# Patient Record
Sex: FEMALE | Race: WHITE
[De-identification: names, ages, dates, MRNs, and addresses within clinical notes are randomized per-mention and may not be internally consistent; named-entity substitution may affect disease eponyms.]

---

## 2018-05-04 ENCOUNTER — HOSPITAL ENCOUNTER (OUTPATIENT)
Dept: HOSPITAL 68 - ERH | Age: 63
Setting detail: OBSERVATION
LOS: 1 days | End: 2018-05-05
Attending: INTERNAL MEDICINE | Admitting: INTERNAL MEDICINE
Payer: COMMERCIAL

## 2018-05-04 VITALS — WEIGHT: 154.06 LBS | BODY MASS INDEX: 25.98 KG/M2 | HEIGHT: 64.5 IN

## 2018-05-04 DIAGNOSIS — Z79.82: ICD-10-CM

## 2018-05-04 DIAGNOSIS — R07.89: Primary | ICD-10-CM

## 2018-05-04 DIAGNOSIS — R00.0: ICD-10-CM

## 2018-05-04 DIAGNOSIS — F41.9: ICD-10-CM

## 2018-05-04 DIAGNOSIS — M81.0: ICD-10-CM

## 2018-05-04 DIAGNOSIS — F41.0: ICD-10-CM

## 2018-05-04 DIAGNOSIS — I10: ICD-10-CM

## 2018-05-04 LAB
ABSOLUTE GRANULOCYTE CT: 4.5 /CUMM (ref 1.4–6.5)
BASOPHILS # BLD: 0 /CUMM (ref 0–0.2)
BASOPHILS NFR BLD: 0.4 % (ref 0–2)
EOSINOPHIL # BLD: 0.1 /CUMM (ref 0–0.7)
EOSINOPHIL NFR BLD: 1.8 % (ref 0–5)
ERYTHROCYTE [DISTWIDTH] IN BLOOD BY AUTOMATED COUNT: 13.5 % (ref 11.5–14.5)
GRANULOCYTES NFR BLD: 55.3 % (ref 42.2–75.2)
HCT VFR BLD CALC: 40.6 % (ref 37–47)
LYMPHOCYTES # BLD: 2.6 /CUMM (ref 1.2–3.4)
MCH RBC QN AUTO: 28.4 PG (ref 27–31)
MCHC RBC AUTO-ENTMCNC: 33.6 G/DL (ref 33–37)
MCV RBC AUTO: 84.5 FL (ref 81–99)
MONOCYTES # BLD: 0.8 /CUMM (ref 0.1–0.6)
PLATELET # BLD: 335 /CUMM (ref 130–400)
PMV BLD AUTO: 8.4 FL (ref 7.4–10.4)
RED BLOOD CELL CT: 4.8 /CUMM (ref 4.2–5.4)
WBC # BLD AUTO: 8.2 /CUMM (ref 4.8–10.8)

## 2018-05-04 PROCEDURE — G0378 HOSPITAL OBSERVATION PER HR: HCPCS

## 2018-05-04 NOTE — HISTORY & PHYSICAL
Dee Yo 05/04/18 2330:
General Information and HPI
History of Present Illness:
Ms Weathers is a 62-year-old female with a PMH of anxiety who presents to the 
ED with substernal chest pressure.  Today while driving to a friend's house she 
had a panic attack followed by substernal chest pressure relieved with deep 
breathing.  Her chest pressure was accompanied by palpitations and flushing.  
She also felt disoriented and had to stop driving to reorient herself.  Patient 
reports for the past couple of months she has been having intermittent chest 
pressure at rest.  She has reported her chest pain to her PCP and she recently 
underwent a stress test done that was negative.  She reports on one occasion it 
radiated to her arm but unsure of which side.  She gets panic attacks sometimes 
and may take her lorazepam more frequently than prescribed but this panic attack
seems worse.  Her last episode was a month ago while in a store and she felt 
that she had to leave immediately.  She reports extended grief due to the loss 
of her son from cancer 5 years ago and under a lot of stress due to marital 
problems.  She denies fever, chills, exertional SOB/CP, nausea, vomiting, 
orthopnea, lower extremity edema, urinary or bowel symptoms.
 
In the ED she was tachycardic and was given a full dose ASA and lorazepam that 
has partially relieved her chest pressure.
 
Allergies/Medications
Allergies:
Coded Allergies:
No Known Allergies (05/04/18)
 
Home Med list
Lorazepam 0.5 MG TABLET   1 TAB PO DAILY AS NEEDED SLEEP  (Reported)
 
 
Past History
 
Travel History
Traveled to [place] past 21 day No
 
Medical History
Psychiatric: anxiety
 
Surgical History
Surgical History: non-contributory
 
Review of Systems
 
Review of Systems
Constitutional:
Reports: see HPI. 
 
Exam & Diagnostic Data
Last 24 Hrs of Vital Signs/I&O
 Vital Signs
 
 
Date Time Temp Pulse Resp B/P B/P Pulse O2 O2 Flow FiO2
 
     Mean Ox Delivery Rate 
 
05/04 2249  73 20 147/74  94 Room Air  
 
05/04 2031  86 18 136/72  95 Room Air  
 
05/04 1859       Room Air  
 
05/04 1821 97.8 112 15 153/82  96 Room Air Room Air 
 
 
 
 
Physical Exam
General Appearance Alert, Oriented X3, Cooperative, No Acute Distress
HEENT Atraumatic, PERRLA, EOMI, Mucous Membr. moist/pink
Neck Supple, No JVD, No thryomegaly
Cardiovascular Regular Rate, Normal S1, Normal S2, No Murmurs
Lungs Clear to Auscultation, Normal Air Movement
Abdomen Normal Bowel Sounds, Soft, No Tenderness
Extremities No Edema, Normal Pulses
Last 24 Hrs of Labs/Carlito:
 Laboratory Tests
 
05/04/18 2147:
Troponin I 0.01
 
05/04/18 1839:
Anion Gap 11, Estimated GFR > 60, BUN/Creatinine Ratio 21.3, Glucose 106  H, 
Calcium 9.6, Total Bilirubin 0.5, AST 22, ALT 26, Alkaline Phosphatase 80, 
Troponin I < 0.01, Total Protein 7.0, Albumin 4.4, Globulin 2.6, Albumin/
Globulin Ratio 1.7, Lipase 146, TSH 1.570, CBC w Diff NO MAN DIFF REQ, RBC 4.80,
MCV 84.5, MCH 28.4, MCHC 33.6, RDW 13.5, MPV 8.4, Gran % 55.3, Lymphocytes % 
32.2, Monocytes % 10.3  H, Eosinophils % 1.8, Basophils % 0.4, Absolute 
Granulocytes 4.5, Absolute Lymphocytes 2.6, Absolute Monocytes 0.8  H, Absolute 
Eosinophils 0.1, Absolute Basophils 0
 
 
Diagnostic Data
EKG Results
SR, ST depression in V4, V5, , 
 
Assessment/Plan
Assessment:
Ms Weathers is a 62-year-old female with a PMH of anxiety who presents to the 
ED with substernal chest pressure with ST depressions in the setting of sinus 
tachycardia and negative troponins.  Repeat ECG showed PVCs and nonspecific T-
wave inversion, HR 78.  She also had a recent stress test that was negative.
 
Problem list:
Atypical chest pain most likely secondary to anxiety
History of anxiety
 
Plan:
* Admit to telemetry OBS for further evaluation and monitoring
* Serial troponin/ECG to rule out ACS
* Lipid panel
* D-dimer - low probability of PE
* Resume home meds: Lorazepam 0.5 mg daily
* Consider outpatient psychiatric evaluation for extended grief
 
DVT ppx: sc Lovenox
Diet: Regular
Code: FULL
 
As Ranked By This Provider
Problem List:
 1. Acute electrocardiogram changes
 
 2. Chest pain at rest
 
 
Core Measures/Misc (9/17)
 
Acute Coronary Syndrome
ACS Diagnosis: No
 
Congestive Heart Failure
Congestive Heart Failure Diagnosis No
 
Cerebrovascular Accident
CVA/TIA Diagnosis: No
 
VTE (View Protocol)
VTE Risk Factors Age>40
No Mechanical VTE Prophylaxis d/t N/A MechProphylax Ordered
No VTE Pharm Prophylaxis d/t NA PharmProphylax ordered
 
Sepsis (View protocol)
Sepsis Present: No
 
Breanna Olson MD 05/05/18 0219:
General Information and HPI
MD Statement:
I have seen and personally examined REI WEATHERS and documented this H&P.
 
The patient is a 62 year old F who presented with a patient stated chief 
complaint of [].
 
 
 
Resident Review Statement
Resident Statement: examined this patient, discussed with intern, agreed with 
intern
Other Findings:
This is a 62-year-old female with past medical history significant for anxiety 
who comes in today for chief complaint of chest pressure.  Patient stated that 
while driving to her friend's house she had sudden onset of chest pressure like 
sensation associated with some disorientation.  She endorses some palpitations 
and shortness of breath but said that the sensation was relieved by sighing and 
deep breathing.  This episode was also accompanied by a sensation of warmth/
flushing.  She describes the pain as nonradiating and nonexertional.  Patient 
states that she has had similar episodes but not as intense. She does endorse 
history of anxiety and panic attack.  Last panic attack was around a month ago 
and it was while she was in the supermarket and she had an overwhelming feeling 
that she had to get out of the building.  She has recent stressors including 5-
year-old anniversary of losing her son and some marital troubles.  She states 
she is a never smoker and an occasional drinker, she drank a glass of wine today
with her lunch, denies any drug abuse.  Surgical history pertinent for hernia, 
and fibroid surgery.  Family history pertinent for MI in father in his 70s.  
Patient states that her PCP Oanh Conde DO referred her for a stress test 
sometime earlier this year to evaluate her episodes of chest pressure.  Per 
patient, her stress test was negative.
 
 
Vitals: 97.8, 112, 15, 153/82, 94.
HEENT: Pupils equal and reactive. EOMI
Cardiovascular: Nml s1/s2; no murmurs
Skin:  no erythema, rash or wounds present. 
Respiratory:CTAB
GI: BSX4, No tenderness on palpation. 
EXT:  No skin changes in bilat LE.  Trace edema.
 
 
In ED patient was noted to have EKGs showing sinus tachycardia with slight ST 
depression in V4 and V5 and T-wave inversion in V1.  Repeat EKG showed rate 78, 
2 PVCs and no ST changes.  She received loading dose of aspirin and a dose of 
lorazepam.
 
Assessment: This is a 62-year-old female with past medical history of anxiety 
who comes in for chief complaint of chest pain.  Her medical history, lack of 
social risk factors, and atypical description of chest pain suggests anoncardiac
etiology.  Given, significant history of anxiety, and panic attack along with 
nonexertional chest pain suggests most likely etiology.  However, given EKG 
changes she is admitted to the telemetry floor for further workup.
 
PLAN:
 
Chest pain:
* Aspirin
* Lipid panel.  If elevated consider statin
* Obtain records from PCP
* EKG and troponins
* Cardiology consult in a.m.
 
Anxiety:
* Continue lorazepam
* Consider outpatient psychiatric evaluation
 
 
Vikas,Aartee 05/05/18 0304:
Attending MD Review Statement
 
Attending Statement
Attending MD Statement: examined this patient, discuss w/resident/PA/NP, agreed 
w/resident/PA/NP, reviewed EMR data (avail), reviewed images, amended to note
Attending Assessment/Plan:
 
CC: chest pressure 
PMH: ? Panic attack, anxiety
 
Patient states that she was driving to her friend's house when she noticed that 
she had chest pressure, felt disoriented and dizzy. Chest pressure was 
substernal not radiating, relieved by deep breathing, associated with mild 
shortness of breath and palpitation and feeling hot. Patient felt better after 
Ativan in ER. She denies any pleuritic nature of chest pain, cough, 
expectoration, actual passing out, diaphoresis. Patient has been having similar 
episode since last 2 months, on and off, nonexertional chest pain and 
palpitations, for which she was evaluated with stress test by her primary care 
physician which was normal. She denies any smoking history, father had MI at the
age of 70, no history of dyslipidemia. She denies any nausea, vomiting, 
orthopnea, leg edema, PND, exertional chest pain or dyspnea. She has been going 
through stress secondary to household problems and her son's death 5 years back.
She is on Ativan at home as required and sometimes has to take more than 1 
tablet in a day. 
 
Vitals: Temperature 97.8, pulse 1121 arrival improved to 86, respiration 15, 
blood pressure 153/82, saturating 96% on room air.
On examination: A O 3, cooperative, no acute distress, neck supple, JVD normal,
no lymphadenopathy, mucosa moist, no focal neurological deficit, no dependent 
edema, no obvious skin rashes or inflammation CVS: S1-S2, RRR. RS: Clear to 
auscultate bilaterally. Abdomen: Soft, NT, ND, bowel sounds present.
 
Assessment and plan
 
62 year old female with no significant past medical history came to ER for an 
episode of chest pressure, shortness of breath, palpitation, feeling hot. The 
symptoms are quite suddenly when she was driving. Examination is unremarkable, 
except she was mildly tachycardic on arrival. Troponin negative but initial ECG 
showed ST depression in anterolateral leads. This ST depression appears more 
likely secondary to tachycardia. Her palpitation and chest pain appear more like
a panic attack. Patient has recent stress test which was unremarkable, denies 
any exertional chest pain or shortness of breath, orthopnea or PND. She is 
active with daily walking without any symptoms. According to her she has been 
getting this chest pressure or palpitation and disorientation episodes on and 
off since last 2 months which was evaluated by her primary care physician and 
she is currently on when necessary Ativan. She was tearful in ER, now feels 
relaxed. She refused to see any psychiatrist at this point. I suggested for SSRI
as outpatient. Even though ST depression could be related to related, given her 
age and atypical symptoms and female, we will place her in observation on 
telemetry to rule out ACS, cardiology consult. Low probability of PE, we will 
rule out with d-dimer.
 
+ Chest pain rule out ACS
+ Suspected panic attack
 
- Place in observation on telemetry
- Continuous telemetry monitoring
- Serial troponin and EKGs
- 2-D echo in a.m. if significant increase in troponin or suggested by 
cardiologist
- Cardiology consult in a.m.
- Continue aspirin, check lipid panel
- Check d-dimer

## 2018-05-04 NOTE — ED CARDIAC/CP/PALPITATIONS
History of Present Illness
 
General
Chief Complaint: Chest Pain
Stated Complaint: CHEST PAIN, +N, JAW PAIN
Source: patient, family
Exam Limitations: no limitations
 
Vital Signs & Intake/Output
Vital Signs & Intake/Output
 Vital Signs
 
 
Date Time Temp Pulse Resp B/P B/P Pulse O2 O2 Flow FiO2
 
     Mean Ox Delivery Rate 
 
05/04 2031  86 18 136/72  95 Room Air  
 
05/04 1859       Room Air  
 
05/04 1821 97.8 112 15 153/82  96 Room Air Room Air 
 
 
 
Allergies
Coded Allergies:
No Known Allergies (05/04/18)
 
Reconcile Medications
Aspirin (Aspirin*) 81 MG TAB.CHEW   1 TAB PO DAILY ASA
     .
Atorvastatin Calcium 40 MG TABLET   1 TAB PO 1700 STATIN
     .
Lorazepam 0.5 MG TABLET   1 TAB PO DAILY AS NEEDED SLEEP  (Reported)
 
Triage Note:
PT TO ED FOR C/C OF CHEST PRESSURE TODAY WITH SOME
 DISORIENTATION WHEN TRYING TO DRIVE TO A FRIENDS
 HOUSE. PT REPORTS SOME SOB. TACHYCARDIC IN TRIAGE.
 EKG COMPLETED AND PT TAKEN TO ROOM 10. PT VERY
 ANXIOUS AS WELL.
Triage Nurses Notes Reviewed? yes
HPI:
62F PMH anxiety presents with acute onset of mid-sternal chest pressure and 
heaviness, palpitations, and SOB.  Started abruptly while driving, persisted in 
ER.  No personal or family cardiac history.  Non-smoker, no risk factors.  Has 
had a stressful few days.  Denies fever, chills, headache, lightheadedness, sore
throat, abdominal pain, diarrhea, dysuria.  No prior similar episodes.
 
Past History
 
Travel History
Traveled to [place] past 21 day No
 
Medical History
Any Pertinent Medical History? see below for history
Psychiatric: anxiety
 
Surgical History
Surgical History: non-contributory
 
Psychosocial History
What is your primary language English
Tobacco Use: Never used
 
Family History
Hx Contributory? No
 
Review of Systems
 
Review of Systems
Constitutional:
Reports: no symptoms. 
EENTM:
Reports: no symptoms. 
Respiratory:
Reports: no symptoms. 
Cardiovascular:
Reports: no symptoms. 
GI:
Reports: no symptoms. 
Genitourinary:
Reports: no symptoms. 
Musculoskeletal:
Reports: no symptoms. 
Skin:
Reports: no symptoms. 
Neurological/Psychological:
Reports: no symptoms. 
Hematologic/Endocrine:
Reports: no symptoms. 
Immunologic/Allergic:
Reports: no symptoms. 
All Other Systems: Reviewed and Negative
 
Physical Exam
 
Physical Exam
General Appearance: well developed/nourished, anxious
Head: atraumatic, normal appearance
Eyes:
Bilateral: normal appearance. 
Ears, Nose, Throat: normal ENT inspection, hearing grossly normal
Neck: normal inspection, supple, full range of motion
Respiratory: normal breath sounds, no respiratory distress
Cardiovascular: regular rate/rhythm
Gastrointestinal: soft, non-tender
Back: normal inspection
Extremities: normal inspection
Neurologic/Psych: awake, alert, oriented x 3, normal mood/affect
Skin: intact, normal color, warm/dry
 
Core Measures
ACS in differential dx? Yes
CVA/TIA Diagnosis No
Sepsis Present: No
Sepsis Focused Exam Completed? No
 
Progress
Differential Diagnosis: AMI, aortic dissection, atrial fibrillation, 
cholecystitis, CHF/pulm edema, costochondritis, hyperkalemia, hypovolemia, 
hyperthyroid, hyperventilation, intracranial hemorrhage, musculoskeletal pain, 
myocarditis, pancreatitis, pericarditis, pneumonia, pneumothorax, PSVT, 
pulmonary embolism, PUD/GERD, PVCs/PACs, respiratory failure, rib fracture, 
sepsis, unstable angina, V-fib/V-Tach, WPW syndrome
Plan of Care:
 Orders
 
 
Procedure Date/time Status
 
TROPONIN LEVEL 05/04 2200 Complete
 
EKG 05/04 2200 Active
 
THYROID STIMULATING HORMONE 05/04 1812 Complete
 
TROPONIN LEVEL 05/04 1812 Complete
 
LIPASE 05/04 1812 Complete
 
COMPREHENSIVE METABOLIC PANEL 05/04 1812 Complete
 
CBC WITHOUT DIFFERENTIAL 05/04 1812 Complete
 
EKG 05/04 1801 Active
 
 
 Laboratory Tests
 
 
 
05/04/18 2147:
Troponin I 0.01
 
05/04/18 1839:
Anion Gap 11, Estimated GFR > 60, BUN/Creatinine Ratio 21.3, Glucose 106  H, 
Calcium 9.6, Total Bilirubin 0.5, AST 22, ALT 26, Alkaline Phosphatase 80, 
Troponin I < 0.01, Total Protein 7.0, Albumin 4.4, Globulin 2.6, Albumin/
Globulin Ratio 1.7, Lipase 146, TSH 1.570, CBC w Diff NO MAN DIFF REQ, RBC 4.80,
MCV 84.5, MCH 28.4, MCHC 33.6, RDW 13.5, MPV 8.4, Gran % 55.3, Lymphocytes % 
32.2, Monocytes % 10.3  H, Eosinophils % 1.8, Basophils % 0.4, Absolute 
Granulocytes 4.5, Absolute Lymphocytes 2.6, Absolute Monocytes 0.8  H, Absolute 
Eosinophils 0.1, Absolute Basophils 0
 
Initial ED EKG: NSR, ST depressions in leads V4 and V5, no prior for comparison
 
Departure
 
Departure
Disposition: STILL A PATIENT
Condition: Stable
Clinical Impression
Primary Impression: Chest pain at rest
Secondary Impressions: Acute electrocardiogram changes
Referrals:
Oanh Conde DO (PCP/Family)
 
Departure Forms:
Customer Survey
General Discharge Information
Prescriptions:
Current Visit Scripts
Atorvastatin Calcium 1 TAB PO 1700  
     #30 TAB 
     .
 
Aspirin (Aspirin*) 1 TAB PO DAILY  
     #30 TAB 
     .
 
 
 
Observation Note
Spoke With:
Andree Bean MD
[facility] Patient In: Non-ED OBS Care Area
Rationale for Observation:
My rational for observation is as follows chest pain and SOB with dynamic EKG 
changes, will require serial EKG and troponin, cardiology consult, possible 
stress test.
 
 
Critical Care Note
 
Critical Care Note
Critical Care Time: non-applicable

## 2018-05-04 NOTE — ED CARDIAC/CP/PALPITATIONS
History of Present Illness
 
General
Chief Complaint: Chest Pain
Stated Complaint: CHEST PAIN, +N, JAW PAIN
Source: patient
 
Vital Signs & Intake/Output
Vital Signs & Intake/Output
 Vital Signs
 
 
Date Time Temp Pulse Resp B/P B/P Pulse O2 O2 Flow FiO2
 
     Mean Ox Delivery Rate 
 
05/04 1821 97.8 112 15 153/82  96 Room Air Room Air 
 
 
 
Triage Note:
PT TO ED FOR C/C OF CHEST PRESSURE TODAY WITH SOME
DISORIENTATION WHEN TRYING TO DRIVE TO A FRIENDS
HOUSE. PT REPORTS SOME SOB. TACHYCARDIC IN TRIAGE.
EKG COMPLETED AND PT TAKEN TO ROOM 10. PT VERY
ANXIOUS AS WELL.
 
Past History
 
Travel History
Traveled to [place] past 21 day No
 
Medical History
Psychiatric: anxiety
 
Psychosocial History
What is your primary language English
Tobacco Use: Never used
 
Progress
Plan of Care:
 Orders
 
 
Procedure Date/time Status
 
THYROID STIMULATING HORMONE 05/04 1812 Active
 
TROPONIN LEVEL 05/04 1812 Active
 
LIPASE 05/04 1812 Active
 
COMPREHENSIVE METABOLIC PANEL 05/04 1812 Active
 
CBC WITHOUT DIFFERENTIAL 05/04 1812 Active
 
EKG 05/04 1801 Active
 
 
 
 
Departure
 
Departure
Condition: Stable
Referrals:
Oanh Conde DO (PCP/Family)
 
Departure Forms:
Customer Survey
General Discharge Information

## 2018-05-05 VITALS — SYSTOLIC BLOOD PRESSURE: 132 MMHG | DIASTOLIC BLOOD PRESSURE: 66 MMHG

## 2018-05-05 VITALS — DIASTOLIC BLOOD PRESSURE: 62 MMHG | SYSTOLIC BLOOD PRESSURE: 112 MMHG

## 2018-05-05 LAB
ABSOLUTE GRANULOCYTE CT: 3.3 /CUMM (ref 1.4–6.5)
BASOPHILS # BLD: 0 /CUMM (ref 0–0.2)
BASOPHILS NFR BLD: 0.6 % (ref 0–2)
EOSINOPHIL # BLD: 0.1 /CUMM (ref 0–0.7)
EOSINOPHIL NFR BLD: 2 % (ref 0–5)
ERYTHROCYTE [DISTWIDTH] IN BLOOD BY AUTOMATED COUNT: 13.5 % (ref 11.5–14.5)
GRANULOCYTES NFR BLD: 57.3 % (ref 42.2–75.2)
HCT VFR BLD CALC: 39.5 % (ref 37–47)
LYMPHOCYTES # BLD: 1.8 /CUMM (ref 1.2–3.4)
MCH RBC QN AUTO: 28.3 PG (ref 27–31)
MCHC RBC AUTO-ENTMCNC: 33.5 G/DL (ref 33–37)
MCV RBC AUTO: 84.5 FL (ref 81–99)
MONOCYTES # BLD: 0.5 /CUMM (ref 0.1–0.6)
PLATELET # BLD: 253 /CUMM (ref 130–400)
PMV BLD AUTO: 8.6 FL (ref 7.4–10.4)
RED BLOOD CELL CT: 4.68 /CUMM (ref 4.2–5.4)
WBC # BLD AUTO: 5.8 /CUMM (ref 4.8–10.8)

## 2018-05-05 NOTE — CONS- CARDIOLOGY
General Information and HPI
 
Consulting Request
Date of Consult: 05/05/18
Requested By:
Andree Bean MD
 
Reason for Consult:
chest pain
History of Present Illness:
PLeasant lady with PMH of severe anxiety, grief since untimely death of her son 
5 years ago, hypertension well controlled, osteoporosis.
Has been experiencing retrosternal chest pains during a few minutes to half an 
hour more frequently than usual over the last 4-5 weeks. These episodes always 
occur at rest, are self resolving, never occur during exercise. Patient 
associates it with periods of increased stress. 
Had a normal treadmilld stress test in 02/2018 asked by her PCP due to these 
chest pains, did not reveal any ischemia.
 
On friday 05/04, experienced the same pain while driving, but more intense than 
usual, lasted approximately 1 hour, and pt decided to drive herself to the 
hospital. Here, pain resolved slowly, without any intervention, HR was 122 upon 
arrival, with borderline T wave abnormality in the anterior leads on the EKG at 
that time, which resolved on the subsequent EKG. Serial Troponins were negative.
 
States that she has been under more stress as of late. Is not on any regular 
medication for anxiety, just benzodiazepines prn which she doesnt take 
regularly.
 
Otherwise is in good shape, can walk up a moderate hill at a fast pace without 
dyscomfort , walks regularly and babysits her grandchildren. No syncope, no 
orthopnea, no edema, no bleeding reported.
 
Allergies/Medications
Allergies:
Coded Allergies:
No Known Allergies (05/04/18)
 
Home Med List:
Aspirin (Aspirin*) 81 MG TAB.CHEW   1 TAB PO DAILY ASA
     .
Atorvastatin Calcium 40 MG TABLET   1 TAB PO 1700 STATIN
     .
Lorazepam 0.5 MG TABLET   1 TAB PO DAILY AS NEEDED SLEEP  (Reported)
 
Current Medications:
 Current Medications
 
 
  Sig/Kirk Start time  Last
 
Medication Dose Route Stop Time Status Admin
 
Acetaminophen 650 MG Q6P PRN 05/05 0015 AC 
 
  PO   
 
Aspirin 81 MG DAILY 05/05 0900 AC 05/05
 
  PO   0817
 
Aspirin 0 .STK-MED ONE 05/04 1908 DC 
 
  PO   
 
Aspirin Buffered 325 MG ONCE ONE 05/04 1815 DC 05/04
 
  PO 05/04 1816  1904
 
Atorvastatin Calcium 40 MG 1700 05/05 1700 AC 
 
  PO   
 
Enoxaparin Sodium 40 MG DAILY 05/05 0900 AC 05/05
 
  SC   0817
 
Ibuprofen 600 MG Q6P PRN 05/05 0015 AC 
 
  PO   
 
Lorazepam 0.5 MG DAILY AS NEEDED 05/05 0015 AC 
 
  PO 05/12 0014  
 
Lorazepam 0 .STK-MED ONE 05/04 2324 DC 
 
  PO   
 
Lorazepam 0.5 MG ONE ONE 05/04 2315 DC 05/04
 
  PO 05/04 2316  2319
 
Oxycodone/ 1 TAB Q6P PRN 05/05 0015 AC 
 
Acetaminophen  PO   
 
 
 
 
Review of Systems
Review of Systems:
see HPI
 
Past History
 
Travel History
Traveled to [place] past 21 day No
 
Medical History
Blood Transfusion Hx: No
Psychiatric: anxiety
 
Surgical History
Surgical History: non-contributory
 
Psychosocial History
Smoking Status: Never Smoked
 
Exam & Diagnostic Data
Vital Signs and I&O
Vital Signs
 
 
Date Time Temp Pulse Resp B/P B/P Pulse O2 O2 Flow FiO2
 
     Mean Ox Delivery Rate 
 
05/05 0701 98.0 72 20 112/62  95 Room Air  
 
05/05 0141 97.8 72 20 132/66  95 Room Air  
 
05/05 0017 97.7 77 18 134/82  96 Room Air  
 
05/04 2249  73 20 147/74  94 Room Air  
 
05/04 2031  86 18 136/72  95 Room Air  
 
05/04 1859       Room Air  
 
05/04 1821 97.8 112 15 153/82  96 Room Air Room Air 
 
 
 Intake & Output
 
 
 05/05 1600 05/05 0800 05/05 0000 05/04 1600 05/04 0800 05/04 0000
 
Intake Total  105    
 
Output Total      
 
Balance  105    
 
       
 
Intake, IV  5    
 
Intake, Oral  100    
 
Patient  154 lb    
 
Weight      
 
 
 
 
Physical Exam
General Appearance: well developed/nourished, alert, awake, anxious
Ears, Nose, Throat: normal ENT inspection
Neck: supple, trachea mid line, No JVD
Respiratory: normal breath sounds, lungs clear
Cardiovascular: regular rate/rhythm, No murmur, no rub, normal apical impact.
Gastrointestinal: normal bowel sounds, soft, non-tender, no organomegaly
Extremities: normal capillary refill, no edema
Labs/Carlito Results:
 Laboratory Tests
 
 
 05/05 05/04
 
 0500 2147
 
Chemistry  
 
  Sodium (137 - 145 mmol/L) 142 
 
  Potassium (3.5 - 5.1 mmol/L) 3.7 
 
  Chloride (98 - 107 mmol/L) 109  H 
 
  Carbon Dioxide (22 - 30 mmol/L) 24 
 
  Anion Gap (5 - 16) 9 
 
  BUN (7 - 17 mg/dL) 16 
 
  Creatinine (0.5 - 1.0 mg/dL) 0.5 
 
  Estimated GFR (>60 ml/min) > 60 
 
  BUN/Creatinine Ratio (7 - 25 %) 32.0  H 
 
  Troponin I (< 0.11 ng/ml) < 0.01 0.01
 
Hematology  
 
  CBC w Diff NO MAN DIFF REQ 
 
  WBC (4.8 - 10.8 /CUMM) 5.8 
 
  RBC (4.20 - 5.40 /CUMM) 4.68 
 
  Hgb (12.0 - 16.0 G/DL) 13.2 
 
  Hct (37 - 47 %) 39.5 
 
  MCV (81.0 - 99.0 FL) 84.5 
 
  MCH (27.0 - 31.0 PG) 28.3 
 
  MCHC (33.0 - 37.0 G/DL) 33.5 
 
  RDW (11.5 - 14.5 %) 13.5 
 
  Plt Count (130 - 400 /CUMM) 253 
 
  MPV (7.4 - 10.4 FL) 8.6 
 
  Gran % (42.2 - 75.2 %) 57.3 
 
  Lymphocytes % (20.5 - 51.1 %) 31.5 
 
  Monocytes % (1.7 - 9.3 %) 8.6 
 
  Eosinophils % (0 - 5 %) 2.0 
 
  Basophils % (0.0 - 2.0 %) 0.6 
 
  Absolute Granulocytes (1.4 - 6.5 /CUMM) 3.3 
 
  Absolute Lymphocytes (1.2 - 3.4 /CUMM) 1.8 
 
  Absolute Monocytes (0.10 - 0.60 /CUMM) 0.5 
 
  Absolute Eosinophils (0.0 - 0.7 /CUMM) 0.1 
 
  Absolute Basophils (0.0 - 0.2 /CUMM) 0 
 
 
 
 
 05/04
 
 1839
 
Chemistry 
 
  Sodium (137 - 145 mmol/L) 143
 
  Potassium (3.5 - 5.1 mmol/L) 3.8
 
  Chloride (98 - 107 mmol/L) 106
 
  Carbon Dioxide (22 - 30 mmol/L) 26
 
  Anion Gap (5 - 16) 11
 
  BUN (7 - 17 mg/dL) 17
 
  Creatinine (0.5 - 1.0 mg/dL) 0.8
 
  Estimated GFR (>60 ml/min) > 60
 
  BUN/Creatinine Ratio (7 - 25 %) 21.3
 
  Glucose (65 - 99 mg/dL) 106  H
 
  Calcium (8.4 - 10.2 mg/dL) 9.6
 
  Total Bilirubin (0.2 - 1.3 mg/dL) 0.5
 
  AST (14 - 36 U/L) 22
 
  ALT (9 - 52 U/L) 26
 
  Alkaline Phosphatase (<127 U/L) 80
 
  Troponin I (< 0.11 ng/ml) < 0.01
 
  Total Protein (6.3 - 8.2 g/dL) 7.0
 
  Albumin (3.5 - 5.0 g/dL) 4.4
 
  Globulin (1.9 - 4.2 gm/dL) 2.6
 
  Albumin/Globulin Ratio (1.1 - 2.2 %) 1.7
 
  Triglycerides (<150 mg/dL) 78
 
  Cholesterol (<200 MG/DL) 206  H
 
  LDL Cholesterol, Calc (65 - 129 mg/dL) 119
 
  HDL Cholesterol (40 - 60 mg/dL) 72  H
 
  Cholesterol/HDL Ratio (0.00 - 4.23 %) 3
 
  Lipase (23 - 300 U/L) 146
 
  TSH (0.270 - 4.200 uIU/mL) 1.570
 
Hematology 
 
  CBC w Diff NO MAN DIFF REQ
 
  WBC (4.8 - 10.8 /CUMM) 8.2
 
  RBC (4.20 - 5.40 /CUMM) 4.80
 
  Hgb (12.0 - 16.0 G/DL) 13.6
 
  Hct (37 - 47 %) 40.6
 
  MCV (81.0 - 99.0 FL) 84.5
 
  MCH (27.0 - 31.0 PG) 28.4
 
  MCHC (33.0 - 37.0 G/DL) 33.6
 
  RDW (11.5 - 14.5 %) 13.5
 
  Plt Count (130 - 400 /CUMM) 335
 
  MPV (7.4 - 10.4 FL) 8.4
 
  Gran % (42.2 - 75.2 %) 55.3
 
  Lymphocytes % (20.5 - 51.1 %) 32.2
 
  Monocytes % (1.7 - 9.3 %) 10.3  H
 
  Eosinophils % (0 - 5 %) 1.8
 
  Basophils % (0.0 - 2.0 %) 0.4
 
  Absolute Granulocytes (1.4 - 6.5 /CUMM) 4.5
 
  Absolute Lymphocytes (1.2 - 3.4 /CUMM) 2.6
 
  Absolute Monocytes (0.10 - 0.60 /CUMM) 0.8  H
 
  Absolute Eosinophils (0.0 - 0.7 /CUMM) 0.1
 
  Absolute Basophils (0.0 - 0.2 /CUMM) 0
 
 
 
 
Assessment/Plan
Assessment/Plan
Chest pains likely related to thoracic muscular tension in the context of acute 
over chronic stress, in patient who is overwise at low risk of CV events.
The only atypical issue is the tachycardia at 122 upon arrival, which is high 
for stress induced tachy. In addition, the borderline, non specific changes on 
her EKG during tachycardia justify a stress test to better evaluate. I would be 
inclined to do a nuclear stress test as she has had a standard treadmill test in
02/2018, which was normal.
 
 
Consult Acknowledgment
- Thank you for your consult request.

## 2018-05-05 NOTE — PN- HOUSESTAFF
**See Addendum**
Subjective
Follow-up For:
chest pain
Tele-Events Since Last Visit:
nsr
Subjective:
Saw pt at bedside this AM. No acute overnight events or complaints. She denies 
any overnight episodes of chest pressure.
 
Review of Systems
Constitutional:
Denies: chills, fever, weakness. 
EENTM:
Reports: no symptoms. 
Cardiovascular:
Reports: palpitations.  Denies: chest pain. 
Respiratory:
Reports: no symptoms. 
Gastrointestinal:
Reports: no symptoms. 
Genitourinary:
Reports: no symptoms. 
Musculoskeletal:
Reports: no symptoms. 
Skin:
Reports: no symptoms. 
 
Objective
Last 24 Hrs of Vital Signs/I&O
 Vital Signs
 
 
Date Time Temp Pulse Resp B/P B/P Pulse O2 O2 Flow FiO2
 
     Mean Ox Delivery Rate 
 
 0141 97.8 72 20 132/66  95 Room Air  
 
 0017 97.7 77 18 134/82  96 Room Air  
 
 2249  73 20 147/74  94 Room Air  
 
 2031  86 18 136/72  95 Room Air  
 
 1859       Room Air  
 
 1821 97.8 112 15 153/82  96 Room Air Room Air 
 
 
 Intake & Output
 
 
  0800  0000  1600
 
Intake Total   
 
Output Total   
 
Balance   
 
    
 
Patient 69.882 kg  
 
Weight   
 
 
 
 
Physical Exam
General Appearance: Alert, Oriented X3, Cooperative, No Acute Distress
HEENT: Atraumatic, PERRLA, EOMI
Cardiovascular: Regular Rate, Normal S1, Normal S2, No Murmurs
Lungs: Clear to Auscultation, Normal Air Movement
Abdomen: No Tenderness
Neurological: Normal Speech, Strength at 5/5 X4 Ext, Cranial Nerves 3-12 NL
Extremities: No Edema
Current Medications:
 Current Medications
 
 
  Sig/Kirk Start time  Last
 
Medication Dose Route Stop Time Status Admin
 
Acetaminophen 650 MG Q6P PRN 15 AC 
 
  PO   
 
Aspirin 81 MG DAILY 900 AC 
 
  PO   
 
Aspirin 0 .STK-MED ONE 1908 DC 
 
  PO   
 
Aspirin Buffered 325 MG ONCE ONE 1815 DC 
 
  PO 
 
Enoxaparin Sodium 40 MG DAILY 900 AC 
 
  SC   
 
Ibuprofen 600 MG Q6P PRN  0015 AC 
 
  PO   
 
Lorazepam 0.5 MG DAILY AS NEEDED  001 AC 
 
  PO  001  
 
Lorazepam 0 .STK-MED ONE 2324 DC 
 
  PO   
 
Lorazepam 0.5 MG ONE ONE 2315 DC 
 
  PO 6  2319
 
Oxycodone/ 1 TAB Q6P PRN  0015 AC 
 
Acetaminophen  PO   
 
 
 
 
Last 24 Hrs of Lab/Carlito Results
Last 24 Hrs of Labs/Mics:
 Laboratory Tests
 
18 2147:
Troponin I 0.01
 
18 1839:
Anion Gap 11, Estimated GFR > 60, BUN/Creatinine Ratio 21.3, Glucose 106  H, 
Calcium 9.6, Total Bilirubin 0.5, AST 22, ALT 26, Alkaline Phosphatase 80, 
Troponin I < 0.01, Total Protein 7.0, Albumin 4.4, Globulin 2.6, Albumin/
Globulin Ratio 1.7, Triglycerides 78, Cholesterol 206  H, LDL Cholesterol, Calc 
119, HDL Cholesterol 72  H, Cholesterol/HDL Ratio 3, Lipase 146, TSH 1.570, CBC 
w Diff NO MAN DIFF REQ, RBC 4.80, MCV 84.5, MCH 28.4, MCHC 33.6, RDW 13.5, MPV 
8.4, Gran % 55.3, Lymphocytes % 32.2, Monocytes % 10.3  H, Eosinophils % 1.8, 
Basophils % 0.4, Absolute Granulocytes 4.5, Absolute Lymphocytes 2.6, Absolute 
Monocytes 0.8  H, Absolute Eosinophils 0.1, Absolute Basophils 0
 
 
Assessment/Plan
Assessment:
Assessment: This is a 62-year-old female with past medical history of anxiety 
who comes in for chief complaint of chest pain.  Her medical history, lack of 
social risk factors, and atypical description of chest pain suggests anoncardiac
etiology.  Given, significant history of anxiety, and panic attack along with 
nonexertional chest pain suggests most likely etiology.  However, given EKG 
changes she is admitted to the telemetry floor for further workup.
 
PLAN:
 
Chest pain:
* Aspirin
* Started Statin given Cholesterol 200
* Obtain records from PCP
* EKG and troponins x 3 negative
* Cardiology consult in a.m.
 
Anxiety:
* Continue lorazepam
* Consider outpatient psychiatric evaluation
 
Problem List:
 1. Acute electrocardiogram changes
 
Pain Ratin
Pain Location:
none
Pain Goal: Remain pain free
Pain Plan:
none
Tomorrow's Labs & Rationales:
cbc
bep

## 2023-11-01 NOTE — PATIENT DISCHARGE INSTRUCTIONS
**See Addendum**
Discharge Instructions
 
General Discharge Information
You were seen/treated for:
1. Chest pain
Special Instructions:
1. Please see your PCP in one wee
2. Please see your cardiologist in two weeks
3. Take your meds as prescribed
 
Diet
Continue normal diet: No
Recommended Diet: Heart Healthy
 
Activity
Activity Self Limited: Yes
 
Acute Coronary Syndrome
 
Inclusion Criteria
At DC or during hospital stay patient has or had the following:
ACS DIAGNOSIS No
 
Discharge Core Measures
Meds if any: Prescribed or Continued at Discharge
Meds if any: NOT Prescribed or Continued at Discharge
 
Congestive Heart Failure
 
Inclusion Criteria
At DC or during hospital stay patient has or had the following:
CHF DIAGNOSIS No
 
Discharge Core Measures
Meds if any: Prescribed or Continued at Discharge
Meds if any: NOT Prescribed or Continued at Discharge
 
Cerebrovascular accident
 
Inclusion Criteria
At DC or during hospital stay patient has or had the following:
CVA/TIA Diagnosis No
 
Discharge Core Measures
Meds if any: Prescribed or Continued at Discharge
Meds if any: NOT Prescribed or Continued at Discharge
 
Venous thromboembolism
 
Inclusion Criteria
VTE Diagnosis No
VTE Type NONE
VTE Confirmed by (Test) NONE
 
